# Patient Record
Sex: FEMALE
[De-identification: names, ages, dates, MRNs, and addresses within clinical notes are randomized per-mention and may not be internally consistent; named-entity substitution may affect disease eponyms.]

---

## 2023-04-28 ENCOUNTER — NURSE TRIAGE (OUTPATIENT)
Dept: OTHER | Facility: CLINIC | Age: 38
End: 2023-04-28

## 2023-04-28 NOTE — TELEPHONE ENCOUNTER
Location of patient: [place]    Subjective: Caller states \"I have chronic back pain - I was sent to a Chiro and it really did not help me - therapeutic masseuse sates that her rib was out of place and they did fix it - but the pain is back\"     Current Symptoms: mid-upper back pain (rib that was out of place was on the left side), muscle spasms, (causing pain in the neck and arms)    Onset: 4 years ago; unchanged    Associated Symptoms: increased wakefulness, decreased activity    Pain Severity: 6/10; aching; intermittent, waxing and waning    Temperature: denies       What has been tried: Tylenol/Ibuprofen (no help), heating pads    LMP: NA Pregnant: No    Recommended disposition: See PCP within 3 Days    Care advice provided, patient verbalizes understanding; denies any other questions or concerns; instructed to call back for any new or worsening symptoms. Patient/caller agrees to follow-up with PCP     This triage is a result of a call to [address] [place] [address]. Please do not respond to the triage nurse through this encounter. Any subsequent communication should be directly with the patient.       Reason for Disposition   [1] MODERATE back pain (e.g., interferes with normal activities) AND [2] present > 3 days    Protocols used: Back Pain-ADULT-